# Patient Record
(demographics unavailable — no encounter records)

---

## 2025-06-13 NOTE — HISTORY OF PRESENT ILLNESS
[Patient reported PAP Smear was normal] : Patient reported PAP Smear was normal [FreeTextEntry1] : 36 year old  female presents for annual. Last seen in 2024 and had a negative pap. Currently taking Crestor. Reports doing well and no complaints. Not planning for any more pregnancies. Notes her son is going into pre-K. States she is going to [employer] in August.   ObHx: NSVDx1 (2021-complicated by gDM), MAB x1(2019), SAB x1 GYNHx: h/o polyp(s/p polypectomy)  PSHx: polypectomy(3/2019), D&C  SocHx: son is going into pre-K  MedHx: crestor Allergies: NKDA [PapSmeardate] : 6/2024

## 2025-06-13 NOTE — END OF VISIT
[FreeTextEntry3] :  I, Ramonita Ogden, acted as a scribe on behalf of Dr. Esther Crook M.D. on 06/13/2025.   All medical entries made by the scribe were at my, Dr. Esther Crook M.D., direction and personally dictated by me on 06/13/2025. I have reviewed the chart and agree that the record accurately reflects my personal performance of the history, physical exam, assessment and plan. I have also personally directed, reviewed, and agreed with the chart.

## 2025-06-13 NOTE — PLAN
[FreeTextEntry1] : 36 year old  female presents for annual.  -Pap UTD -Mammo at age 40  F/u in 1 yr.